# Patient Record
Sex: FEMALE | Race: OTHER
[De-identification: names, ages, dates, MRNs, and addresses within clinical notes are randomized per-mention and may not be internally consistent; named-entity substitution may affect disease eponyms.]

---

## 2019-08-12 ENCOUNTER — HOSPITAL ENCOUNTER (INPATIENT)
Dept: HOSPITAL 93 - ER | Age: 69
LOS: 4 days | Discharge: HOME | DRG: 734 | End: 2019-08-16
Attending: INTERNAL MEDICINE | Admitting: INTERNAL MEDICINE
Payer: COMMERCIAL

## 2019-08-12 VITALS — WEIGHT: 150 LBS | BODY MASS INDEX: 26.58 KG/M2 | HEIGHT: 63 IN

## 2019-08-12 DIAGNOSIS — Z79.01: ICD-10-CM

## 2019-08-12 DIAGNOSIS — C77.5: ICD-10-CM

## 2019-08-12 DIAGNOSIS — E03.8: ICD-10-CM

## 2019-08-12 DIAGNOSIS — C54.1: Primary | ICD-10-CM

## 2019-08-12 DIAGNOSIS — N93.8: ICD-10-CM

## 2019-08-12 DIAGNOSIS — E78.49: ICD-10-CM

## 2019-08-12 DIAGNOSIS — I48.2: ICD-10-CM

## 2019-08-12 DIAGNOSIS — I10: ICD-10-CM

## 2019-08-12 NOTE — NUR
PTE ALERTA Y ORIENTADA X3, SE COLOCA EN CAMIOLLA CON BARANDAS ELEVADAS
[place]. SE CONECTA A MONITOR CARDIACO Y OXIMETRIA. SE CANALIZA EN BRAZO
DERECHO AREA GABRIEL DE EDEMA Y DE ENROJECIMIENTO. SE LE ADMINISTRA Y COMIENZA
DRIP DE AMIODARONE 450MG/250ML A 33ML/HR JENNIE ORDEN MEDICA. SE EDUCA A
PTE/FAMILIAR SOBRE TRATAMIENTO MEDICO.

## 2019-08-12 NOTE — NUR
PTE VIENE BAJO REFERIDO DE ZAK PANDEY, PTE INDICA QUE TIENEN QUE HACERLE
ESTUDIOS AL LUCIA. PTE NO DEMUESTRA DOLOR EN ESTOS MOMENTOS. PTE ALERTA
CONSCIENTE Y ORIENTADA X3

## 2019-08-12 NOTE — NUR
SE RECIBE DE TURNO ANTERIOR EN UNIDAD DE CHEST PAIN,NATHAN #[id number]. FEMINA DE [id number]
ANOS,ALERTA,ORIENTADA EN LINCOLN ESFERAS,EN COMPANIA DE FAMILIAR. DESCANSANDO EN
CAMA NIVEL MAS BAJO,BARANDAS ELEVADAS,FRENOS,ROMAN DE IDENTIFICACION COLOCADOS
POR SEGURIDAD. CONECTADA A  MONITOR CARDIACO,OXIMETRIA DE PULSO CONTINUA. SE
OBSERVA CON BUEN PATRON RESPIRATORIO. PIEL TIBIA AL TACTO. SE OBSERVA
VENOPUNCION LIMPIA,SECA,GABRIEL DE S/S EDEMA Y/O ERITEMA.RECIBIENDO DRIP DE
AMIODARONE 450MG/250ML DSW5% @ 33ML/HR;LUEGO DE OCHO HORAS (6:30AM) RATE
CAMBIARA 16ML/HR POR 16HRS. ABDOMEN DEPRESIBLE AL TACTO. EXTREMIDADES
SUPERIORES E INFERIORES LIBRES DE EDEMA. SE ELVIS COMODA EN CAMA, BAJO
OBSERVACION.

## 2019-08-13 PROCEDURE — 4A12X4Z MONITORING OF CARDIAC ELECTRICAL ACTIVITY, EXTERNAL APPROACH: ICD-10-PCS | Performed by: INTERNAL MEDICINE

## 2019-08-13 PROCEDURE — B246ZZZ ULTRASONOGRAPHY OF RIGHT AND LEFT HEART: ICD-10-PCS | Performed by: INTERNAL MEDICINE

## 2019-08-13 NOTE — NUR
SE LE NOTIFICA AL  Y A GERMÁN RODNEY DE PTE Y SE ORIENTA PTE SOBRE
TRATAMIENTO MEDICO. SE LE EXTRAEN MUESTRAS DE CHRIST Y SE ENVIAN AL LABORATORIO
INGIERE ALIMENTO LA CUAL TOLERA. SE MANTIENE EN OBSERVACION.

## 2019-08-13 NOTE — NUR
SE RECIBE PTE DEL TURNO ANTERIOR EN NATHAN CON BARANDAS ELEVADAS CON IVFS
GABRIEL DE EDEMA Y ERITEMA BAJANDO DRIP DE AMIODARONE 450MG/250ML AT 16ML/HR POR
16 HORAS, ALERTA, ORIENTADA POR LINCOL[place]. CONECTADA A MONITOR CARDIACO. NO PRESENTA
DOLOR DE PECHO AL MOMENTO PENDIENTE A EVALUACION CON EL . SE MANTIENE
EN OBSERVACION.

## 2019-08-13 NOTE — NUR
SE ORIENTA PACIENTE SOBRE ORDEN MEDICA DE MEDICAMENTO ORDENADO POR
,LA MISMA REFIERE COMPRENDER. SE COMIENZA DRIP DE AMIODARONE
450MG/250ML DSW (SUNG) BAJANDO 16ML/HR LIZZETH 16HRS.  PACIENTE NO PRESENTA
REACCION ADVERSA AL MOMENTO DE ADMINISTRACION.

## 2019-08-15 PROCEDURE — 07TC4ZZ RESECTION OF PELVIS LYMPHATIC, PERCUTANEOUS ENDOSCOPIC APPROACH: ICD-10-PCS | Performed by: OBSTETRICS & GYNECOLOGY

## 2019-08-15 PROCEDURE — 0UT24ZZ RESECTION OF BILATERAL OVARIES, PERCUTANEOUS ENDOSCOPIC APPROACH: ICD-10-PCS | Performed by: OBSTETRICS & GYNECOLOGY

## 2019-08-15 PROCEDURE — 0UT74ZZ RESECTION OF BILATERAL FALLOPIAN TUBES, PERCUTANEOUS ENDOSCOPIC APPROACH: ICD-10-PCS | Performed by: OBSTETRICS & GYNECOLOGY

## 2019-08-15 PROCEDURE — 0UT94ZZ RESECTION OF UTERUS, PERCUTANEOUS ENDOSCOPIC APPROACH: ICD-10-PCS | Performed by: OBSTETRICS & GYNECOLOGY
